# Patient Record
Sex: FEMALE | Race: WHITE
[De-identification: names, ages, dates, MRNs, and addresses within clinical notes are randomized per-mention and may not be internally consistent; named-entity substitution may affect disease eponyms.]

---

## 2017-07-14 ENCOUNTER — HOSPITAL ENCOUNTER (EMERGENCY)
Dept: HOSPITAL 25 - UCCORT | Age: 27
Discharge: HOME | End: 2017-07-14
Payer: COMMERCIAL

## 2017-07-14 VITALS — DIASTOLIC BLOOD PRESSURE: 92 MMHG | SYSTOLIC BLOOD PRESSURE: 143 MMHG

## 2017-07-14 DIAGNOSIS — J20.9: Primary | ICD-10-CM

## 2017-07-14 PROCEDURE — 99212 OFFICE O/P EST SF 10 MIN: CPT

## 2017-07-14 PROCEDURE — G0463 HOSPITAL OUTPT CLINIC VISIT: HCPCS

## 2017-07-14 NOTE — UC
Respiratory Complaint HPI





- HPI Summary


HPI Summary: 


cough, congestion, green sputum for 4 weeks did have fevers








- History of Current Complaint


Chief Complaint: UCRespiratory


Stated Complaint: COUGH


Time Seen by Provider: 07/14/17 19:54


Hx Obtained From: Patient


Hx Last Menstrual Period: 3/22/13


Pregnant?: No


Onset/Duration: Lasting Weeks - 4, Still Present


Timing: Constant


Severity Initially: Mild


Severity Currently: Mild


Pain Intensity: 2


Pain Scale Used: 0-10 Numeric


Character: Cough: Productive


Aggravating Factors: Nothing


Alleviating Factors: Nothing


Associated Signs And Symptoms: Positive: Fever, Chills, URI, Nasal Congestion, 

Sinus Discomfort





- Allergies/Home Medications


Allergies/Adverse Reactions: 


 Allergies











Allergy/AdvReac Type Severity Reaction Status Date / Time


 


No Known Allergies Allergy   Verified 07/14/17 19:52














PMH/Surg Hx/FS Hx/Imm Hx


Previously Healthy: Yes





- Surgical History


Surgical History: Yes


Surgery Procedure, Year, and Place: T & A,  MARIAN BUNIONECTOMY, wisdom tooth 

extraction, cyst removed from left ovary





- Family History


Known Family History: Positive: None


Family History: no cardiovascular issues reported in family lineage





- Social History


Occupation: Employed Full-time


Lives: With Family


Alcohol Use: Weekly


Alcohol Amount: 1-2 per week


Substance Use Type: None


Substance Use Comment - Amount & Last Used: xanax


Smoking Status (MU): Never Smoked Tobacco





Review of Systems


Constitutional: Fever - subjective


Skin: Negative


Eyes: Negative


ENT: Negative


Respiratory: Cough - green sputum


Cardiovascular: Negative


Gastrointestinal: Negative


Genitourinary: Negative


Motor: Negative


Neurovascular: Negative


Musculoskeletal: Negative


Neurological: Negative


Psychological: Negative


All Other Systems Reviewed And Are Negative: Yes





Physical Exam


Triage Information Reviewed: Yes


Appearance: Well-Appearing, No Pain Distress, Well-Nourished


Vital Signs: 


 Initial Vital Signs











Temp  98.6 F   07/14/17 19:47


 


Pulse  118   07/14/17 19:47


 


Resp  14   07/14/17 19:47


 


BP  143/92   07/14/17 19:47


 


Pulse Ox  100   07/14/17 19:47











Vital Signs Reviewed: Yes


Eye Exam: Normal


Eyes: Positive: Conjunctiva Clear


ENT Exam: Normal


ENT: Positive: Normal ENT inspection, Hearing grossly normal, Pharynx normal, 

TMs normal.  Negative: Tonsillar swelling, Tonsillar exudate, Trismus, Muffled/

hoarse voice


Dental Exam: Normal


Neck exam: Normal


Neck: Positive: Supple, Nontender, No Lymphadenopathy


Respiratory Exam: Normal


Respiratory: Positive: Chest non-tender, Lungs clear, Normal breath sounds, No 

respiratory distress, No accessory muscle use


Cardiovascular Exam: Normal


Cardiovascular: Positive: RRR, No Murmur, Pulses Normal, Brisk Capillary Refill


Musculoskeletal Exam: Normal


Musculoskeletal: Positive: Strength Intact, ROM Intact, No Edema


Neurological Exam: Normal


Neurological: Positive: Alert, Muscle Tone Normal


Psychological Exam: Normal


Skin Exam: Normal





UC Diagnostic Evaluation





- Laboratory


O2 Sat by Pulse Oximetry: 100





Respiratory Course/Dx





- Course


Course Of Treatment: zithromax, robitussin and codiene, albuterol, increase 

fluids, follow with pcp





- Differential Dx/Diagnosis


Differential Diagnosis/HQI/PQRI: Asthma, Bronchitis, Lower Resp Infection, 

Sinusitis, Tuberculosis, Other - whooping cough


Provider Diagnoses: Bronchitis, with bronchospasm





Discharge





- Discharge Plan


Condition: Stable


Disposition: HOME


Prescriptions: 


Albuterol HFA INHALER* [Ventolin HFA Inhaler*] 2 puff INH Q6H PRN #1 mdi


 PRN Reason: cough


Azithromycin TAB* [Zithromax TAB (Z-VANESSA) 250 mg #6 tabs] 2 tab PO .TODAY, THEN 

1 DAILY #1 vanessa


guaiFENesin/CODIEN 100MG-10MG* [Robitussin AC 100Mg-10Mg*] 10 ml PO Q4H PRN #

120 udc MDD 60ml


 PRN Reason: Cough


Patient Education Materials:  Acute Cough (ED)


Referrals: 


[facility] [place] - If Needed

## 2018-02-25 ENCOUNTER — HOSPITAL ENCOUNTER (EMERGENCY)
Dept: HOSPITAL 25 - UCCORT | Age: 28
Discharge: HOME | End: 2018-02-25
Payer: COMMERCIAL

## 2018-02-25 VITALS — SYSTOLIC BLOOD PRESSURE: 135 MMHG | DIASTOLIC BLOOD PRESSURE: 91 MMHG

## 2018-02-25 DIAGNOSIS — J32.9: Primary | ICD-10-CM

## 2018-02-25 PROCEDURE — G0463 HOSPITAL OUTPT CLINIC VISIT: HCPCS

## 2018-02-25 PROCEDURE — 99212 OFFICE O/P EST SF 10 MIN: CPT

## 2018-02-25 PROCEDURE — 87502 INFLUENZA DNA AMP PROBE: CPT

## 2018-02-25 NOTE — UC
Throat Pain/Nasal Jaquan HPI





- HPI Summary


HPI Summary: 





sore throat, fever/chills, earache bilaterally since friday and muscle aches 

and pains started last night - took sinus/cold and flu otc with some relief





- History of Current Complaint


Chief Complaint: UCGeneralIllness


Stated Complaint: CHILLS/HA/FEVER


Time Seen by Provider: 02/25/18 15:46


Hx Obtained From: Patient


Hx Last Menstrual Period: 2/11/18


Onset/Duration: Lasting Days


Severity: Moderate


Pain Intensity: 4


Associated Signs & Symptoms: Positive: Negative





- Epiglottits Risk Factors


Epiglottis Risk Factors: Negative





- Allergies/Home Medications


Allergies/Adverse Reactions: 


 Allergies











Allergy/AdvReac Type Severity Reaction Status Date / Time


 


No Known Allergies Allergy   Verified 02/25/18 15:44














PMH/Surg Hx/FS Hx/Imm Hx


Previously Healthy: Yes





- Surgical History


Surgical History: Yes


Surgery Procedure, Year, and Place: T & A,  MARIAN BUNIONECTOMY, wisdom tooth 

extraction, cyst removed from left ovary





- Family History


Known Family History: Positive: None


Family History: no cardiovascular issues reported in family lineage





- Social History


Alcohol Use: Weekly


Alcohol Amount: 1-2 per week


Substance Use Type: None


Substance Use Comment - Amount & Last Used: xanax


Smoking Status (MU): Never Smoked Tobacco





Review of Systems


Constitutional: Fever, Chills


Skin: Negative


Eyes: Negative


ENT: Sore Throat, Ear Ache


Respiratory: Cough - nonprod


Cardiovascular: Negative


Gastrointestinal: Negative


Genitourinary: Negative


Motor: Negative


Neurovascular: Negative


Musculoskeletal: Negative


Neurological: Negative


Psychological: Negative


Is Patient Immunocompromised?: No


All Other Systems Reviewed And Are Negative: Yes





Physical Exam


Triage Information Reviewed: Yes


Appearance: Ill-Appearing


Vital Signs: 


 Initial Vital Signs











Temp  98.1 F   02/25/18 15:39


 


Pulse  94   02/25/18 15:39


 


Resp  16   02/25/18 15:39


 


BP  135/91   02/25/18 15:39


 


Pulse Ox  98   02/25/18 15:39











Vital Signs Reviewed: Yes


Eye Exam: Normal


ENT: Positive: Pharyngeal erythema, TM bulging - marian


Respiratory Exam: Normal


Cardiovascular Exam: Normal


Neurological Exam: Normal


Psychological Exam: Normal


Skin Exam: Normal





Throat Pain/Nasal Course/Dx





- Course


Course Of Treatment: influenza swab ordered - results negative.  continue to 

drink plenty of water daily.  take abx as directed -discussed use and common 

side effects.  tylenol or ibuprofen prn every 4-6 hours for fever/pain.  f/u 

pcp if symptoms not resolving





- Differential Dx/Diagnosis


Provider Diagnoses: sinusitis





Discharge





- Discharge Plan


Condition: Good


Disposition: HOME


Prescriptions: 


Azithromyxin KEVIN (NF) [Z-Kevin (Zithromax) 250 mg tabs #6] 2 tab PO .TODAY, THEN 

1 DAILY 5 Days #6 tab


Patient Education Materials:  Sinusitis (ED)


Referrals: 


Non Staff,Doctor [Primary Care Provider] - 1 Week (PCP referral)

## 2020-01-19 ENCOUNTER — HOSPITAL ENCOUNTER (EMERGENCY)
Dept: HOSPITAL 25 - UCCORT | Age: 30
Discharge: HOME | End: 2020-01-19
Payer: COMMERCIAL

## 2020-01-19 VITALS — SYSTOLIC BLOOD PRESSURE: 142 MMHG | DIASTOLIC BLOOD PRESSURE: 90 MMHG

## 2020-01-19 DIAGNOSIS — J11.1: Primary | ICD-10-CM

## 2020-01-19 PROCEDURE — G0463 HOSPITAL OUTPT CLINIC VISIT: HCPCS

## 2020-01-19 PROCEDURE — 99212 OFFICE O/P EST SF 10 MIN: CPT

## 2020-01-19 NOTE — UC
FLU HPI





- HPI Summary


HPI Summary: 





Flu- like symptoms starting last evening with cough, fever, chills, body aches, 

headache. Pt works as a home health aide.





- History of Current Complaint


Chief Complaint: UCRespiratory


Stated Complaint: FEVER, ACHY, COUGH


Time Seen by Provider: 01/19/20 13:29


Hx Obtained From: Patient


Hx Last Menstrual Period: 12/18/20


Pregnant?: No


Onset/Duration: Sudden Onset


Severity Currently: Moderate


Severity Initially: Moderate


Pain Intensity: 6


Associated Signs & Symptoms: Positive: Fever, Myalgia, Cough, Nasal Congestion, 

Headache


Related Hx: Possible Flu/Infectious Exposure





- Allergy/Home Medications


Allergies/Adverse Reactions: 


 Allergies











Allergy/AdvReac Type Severity Reaction Status Date / Time


 


No Known Allergies Allergy   Verified 01/19/20 13:18











Home Medications: 


 Home Medications





Sertraline* [Zoloft*] 50 mg PO BEDTIME 01/19/20 [History Confirmed 01/19/20]











PMH/Surg Hx/FS Hx/Imm Hx


Previously Healthy: Yes





- Surgical History


Surgical History: Yes


Surgery Procedure, Year, and Place: T & A,  MARIAN BUNIONECTOMY, wisdom tooth 

extraction, cyst removed from left ovary





- Family History


Known Family History: Positive: None


Family History: no cardiovascular issues reported in family lineage





- Social History


Occupation: Employed Full-time


Lives: With Family


Alcohol Use: Weekly


Alcohol Amount: 1-2 per week


Substance Use Type: None


Substance Use Comment - Amount & Last Used: xanax


Smoking Status (MU): Never Smoked Tobacco





Review of Systems


All Other Systems Reviewed And Are Negative: Yes


Constitutional: Positive: Fever, Chills


ENT: Positive: Nasal Discharge


Respiratory: Positive: Cough - dry cough


Musculoskeletal: Positive: Myalgia


Neurological: Positive: Headache


Is Patient Immunocompromised?: No





Physical Exam


Triage Information Reviewed: Yes


Appearance: Well-Appearing, No Pain Distress, Well-Nourished


Vital Signs: 


 Initial Vital Signs











Temp  98.6 F   01/19/20 13:19


 


Pulse  119   01/19/20 13:19


 


Resp  16   01/19/20 13:19


 


BP  142/90   01/19/20 13:19


 


Pulse Ox  100   01/19/20 13:19











Vital Signs Reviewed: Yes


Eyes: Positive: Conjunctiva Clear


ENT: Positive: Hearing grossly normal, Pharynx normal, TMs normal, Uvula midline


Neck: Positive: Supple, Nontender, No Lymphadenopathy


Respiratory: Positive: Lungs clear, Normal breath sounds, No respiratory 

distress, No accessory muscle use


Cardiovascular: Positive: No Murmur, Pulses Normal, Brisk Capillary Refill, 

Tachycardia


Musculoskeletal Exam: Normal


Neurological Exam: Normal


Psychological Exam: Normal


Skin Exam: Normal





Flu Course/Dx





- Course


Course Of Treatment: 





Rapid flu test positive for A and B





We discussed comfort measures since pt has no chronic ailments but patient 

opted to be treated with Tamiflu.





- Differential Dx/Diagnosis


Provider Diagnosis: 


 Influenza








Discharge ED





- Sign-Out/Discharge


Documenting (check all that apply): Patient Departure


All imaging exams completed and their final reports reviewed: No Studies





- Discharge Plan


Condition: Fair


Disposition: HOME


Prescriptions: 


Oseltamivir CAP* [Tamiflu CAP*] 75 mg PO BID 5 Days #10 cap


Patient Education Materials:  Influenza (DC)


Forms:  *Work Release


Referrals: 


Sacha Saxena MD [Primary Care Provider] - 


Additional Instructions: 


Increase fluids, May alternate Tylenol every 4 hours with Motrin every 8 hours 

for fever/body aches. Follow up with your primary care doctor in 4-5 days if no 

improvement





- Billing Disposition and Condition


Condition: FAIR


Disposition: Home





- Attestation Statements


Provider Attestation: 


I was available for consult. This patient was seen by the KATIUSKA. The patient was 

not presented to , seen by or examined by me -Yaritza Davalos MD